# Patient Record
Sex: FEMALE | URBAN - METROPOLITAN AREA
[De-identification: names, ages, dates, MRNs, and addresses within clinical notes are randomized per-mention and may not be internally consistent; named-entity substitution may affect disease eponyms.]

---
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
## 2023-01-13 ENCOUNTER — OFFICE VISIT (OUTPATIENT)
Dept: URBAN - METROPOLITAN AREA CLINIC 5 | Facility: CLINIC | Age: 18
End: 2023-01-13
Payer: COMMERCIAL

## 2023-01-13 DIAGNOSIS — H52.13 MYOPIA, BILATERAL: ICD-10-CM

## 2023-01-13 DIAGNOSIS — Q14.2 CONGENITAL MALFORMATION OF OPTIC DISC: ICD-10-CM

## 2023-01-13 DIAGNOSIS — Z01.01 ENCOUNTER FOR EXAM OF EYES AND VISION WITH ABNORMAL FINDINGS: Primary | ICD-10-CM

## 2023-01-13 PROCEDURE — S0620 ROUTINE OPHTHALMOLOGICAL EXA: HCPCS | Performed by: OPTOMETRIST

## 2023-01-13 PROCEDURE — 92250 FUNDUS PHOTOGRAPHY W/I&R: CPT | Performed by: OPTOMETRIST

## 2023-01-13 PROCEDURE — 92015 DETERMINE REFRACTIVE STATE: CPT | Performed by: OPTOMETRIST

## 2023-01-13 ASSESSMENT — VISUAL ACUITY
OD: 20/25
OS: 20/25

## 2023-01-13 ASSESSMENT — INTRAOCULAR PRESSURE
OD: 15
OS: 15

## 2023-01-13 NOTE — IMPRESSION/PLAN
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 Plan: Anomalous disc with nasal elevation OU (myopes tend to have this finding) VS mild psuedotumor cerebri - baseline FP and RNFL scans today (RNFL average thicknesses 107/102). Plan to repeat scan in 6 months.

## 2023-01-13 NOTE — IMPRESSION/PLAN
Impression: Encounter for exam of eyes and vision with abnormal findings: Z01.01. Plan: Blurry vision / refractive error: Prescription given for glasses. Medical dilated exam was performed today with abnormal findings. See plan 3.